# Patient Record
Sex: MALE | Race: WHITE | NOT HISPANIC OR LATINO | Employment: FULL TIME | URBAN - METROPOLITAN AREA
[De-identification: names, ages, dates, MRNs, and addresses within clinical notes are randomized per-mention and may not be internally consistent; named-entity substitution may affect disease eponyms.]

---

## 2024-02-07 ENCOUNTER — OCCUPATIONAL HEALTH (OUTPATIENT)
Dept: URGENT CARE | Facility: CLINIC | Age: 49
End: 2024-02-07

## 2024-02-07 VITALS — BODY MASS INDEX: 29.78 KG/M2 | HEIGHT: 70 IN | WEIGHT: 208 LBS

## 2024-02-07 DIAGNOSIS — Z02.1 PRE-EMPLOYMENT EXAMINATION: Primary | ICD-10-CM

## 2024-02-07 LAB
CTP QC/QA: YES
POC 10 PANEL DRUG SCREEN: NEGATIVE

## 2024-02-07 PROCEDURE — 99499 UNLISTED E&M SERVICE: CPT | Mod: S$GLB,,, | Performed by: NURSE PRACTITIONER

## 2024-02-07 PROCEDURE — 80305 DRUG TEST PRSMV DIR OPT OBS: CPT | Mod: S$GLB,,, | Performed by: NURSE PRACTITIONER

## 2024-02-07 PROCEDURE — 94010 BREATHING CAPACITY TEST: CPT | Mod: S$GLB,,, | Performed by: NURSE PRACTITIONER

## 2024-02-07 PROCEDURE — 99080 SPECIAL REPORTS OR FORMS: CPT | Mod: S$GLB,,, | Performed by: NURSE PRACTITIONER

## 2024-02-29 ENCOUNTER — OCCUPATIONAL HEALTH (OUTPATIENT)
Dept: URGENT CARE | Facility: CLINIC | Age: 49
End: 2024-02-29

## 2024-02-29 DIAGNOSIS — Z13.9 ENCOUNTER FOR SCREENING: Primary | ICD-10-CM

## 2024-02-29 PROCEDURE — 97750 PHYSICAL PERFORMANCE TEST: CPT | Mod: S$GLB,,, | Performed by: NURSE PRACTITIONER

## 2024-04-13 ENCOUNTER — CLINICAL SUPPORT (OUTPATIENT)
Dept: OTHER | Facility: CLINIC | Age: 49
End: 2024-04-13

## 2024-04-13 DIAGNOSIS — Z00.8 ENCOUNTER FOR OTHER GENERAL EXAMINATION: ICD-10-CM

## 2024-04-16 VITALS
SYSTOLIC BLOOD PRESSURE: 142 MMHG | DIASTOLIC BLOOD PRESSURE: 88 MMHG | HEIGHT: 71 IN | WEIGHT: 209 LBS | BODY MASS INDEX: 29.26 KG/M2

## 2024-04-16 LAB
HDLC SERPL-MCNC: 47 MG/DL
POC CHOLESTEROL, TOTAL: 120 MG/DL
POC GLUCOSE, FASTING: 101 MG/DL (ref 60–110)
TRIGL SERPL-MCNC: 44 MG/DL

## 2024-05-20 ENCOUNTER — OFFICE VISIT (OUTPATIENT)
Dept: URGENT CARE | Facility: CLINIC | Age: 49
End: 2024-05-20
Payer: COMMERCIAL

## 2024-05-20 VITALS
OXYGEN SATURATION: 98 % | HEART RATE: 70 BPM | SYSTOLIC BLOOD PRESSURE: 159 MMHG | BODY MASS INDEX: 30.66 KG/M2 | WEIGHT: 219 LBS | RESPIRATION RATE: 18 BRPM | HEIGHT: 71 IN | DIASTOLIC BLOOD PRESSURE: 96 MMHG

## 2024-05-20 DIAGNOSIS — Z02.6 ENCOUNTER RELATED TO WORKER'S COMPENSATION CLAIM: ICD-10-CM

## 2024-05-20 DIAGNOSIS — M25.562 PAIN AND SWELLING OF KNEE, LEFT: ICD-10-CM

## 2024-05-20 DIAGNOSIS — M23.92 INTERNAL DERANGEMENT OF LEFT KNEE: ICD-10-CM

## 2024-05-20 DIAGNOSIS — M25.462 PAIN AND SWELLING OF KNEE, LEFT: ICD-10-CM

## 2024-05-20 DIAGNOSIS — S83.412A SPRAIN OF MEDIAL COLLATERAL LIGAMENT OF LEFT KNEE, INITIAL ENCOUNTER: Primary | ICD-10-CM

## 2024-05-20 LAB
BREATH ALCOHOL: 0
CTP QC/QA: YES
POC 10 PANEL DRUG SCREEN: NEGATIVE

## 2024-05-20 PROCEDURE — 82075 ASSAY OF BREATH ETHANOL: CPT | Mod: S$GLB,,, | Performed by: STUDENT IN AN ORGANIZED HEALTH CARE EDUCATION/TRAINING PROGRAM

## 2024-05-20 PROCEDURE — 99215 OFFICE O/P EST HI 40 MIN: CPT | Mod: S$GLB,,, | Performed by: STUDENT IN AN ORGANIZED HEALTH CARE EDUCATION/TRAINING PROGRAM

## 2024-05-20 PROCEDURE — 73562 X-RAY EXAM OF KNEE 3: CPT | Mod: LT,S$GLB,, | Performed by: RADIOLOGY

## 2024-05-20 PROCEDURE — 80305 DRUG TEST PRSMV DIR OPT OBS: CPT | Mod: S$GLB,,, | Performed by: STUDENT IN AN ORGANIZED HEALTH CARE EDUCATION/TRAINING PROGRAM

## 2024-05-20 RX ORDER — ATORVASTATIN CALCIUM 20 MG/1
1 TABLET, FILM COATED ORAL DAILY
COMMUNITY
Start: 2024-05-17

## 2024-05-20 RX ORDER — AMLODIPINE BESYLATE 2.5 MG/1
1 TABLET ORAL DAILY
COMMUNITY
Start: 2024-05-17

## 2024-05-20 NOTE — LETTER
[facility]  [address] [address]  Phone: [phone]  Fax: [phone]  [facility]: [id number]    Pt Name: Bernabe Kemp  Injury Date: 05/16/2024   Employee ID:  Date of First Treatment: 05/20/2024   Company: [employer]      Appointment Time: 09:45 AM Arrived: 9:31 AM   Provider: Tracy Rogel MD Time Out:12:10 PM     Office Treatment:   1. Sprain of medial collateral ligament of left knee, initial encounter    2. Encounter related to worker's compensation claim    3. Internal derangement of left knee    4. Pain and swelling of knee, left          Patient Instructions: MRI to be scheduled once authorized, Attention not to aggravate affected area        Restrictions: Sit down work only (Patient should be allowed to wear knee brace and use crutches at work. No climbing, kneeling, squatting, or bending.)     Return Appointment: 6/10/2024 at 2:00 PM    KW
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
## 2024-06-10 ENCOUNTER — OFFICE VISIT (OUTPATIENT)
Dept: URGENT CARE | Facility: CLINIC | Age: 49
End: 2024-06-10
Payer: COMMERCIAL

## 2024-06-10 VITALS
BODY MASS INDEX: 30.66 KG/M2 | SYSTOLIC BLOOD PRESSURE: 163 MMHG | HEIGHT: 71 IN | HEART RATE: 76 BPM | RESPIRATION RATE: 12 BRPM | OXYGEN SATURATION: 99 % | DIASTOLIC BLOOD PRESSURE: 85 MMHG | WEIGHT: 219 LBS

## 2024-06-10 DIAGNOSIS — S82.142D CLOSED FRACTURE OF LEFT TIBIAL PLATEAU WITH ROUTINE HEALING, SUBSEQUENT ENCOUNTER: ICD-10-CM

## 2024-06-10 DIAGNOSIS — S83.412D TEAR OF MCL (MEDIAL COLLATERAL LIGAMENT) OF KNEE, LEFT, SUBSEQUENT ENCOUNTER: Primary | ICD-10-CM

## 2024-06-10 DIAGNOSIS — Z02.6 ENCOUNTER RELATED TO WORKER'S COMPENSATION CLAIM: ICD-10-CM

## 2024-06-10 PROCEDURE — 99214 OFFICE O/P EST MOD 30 MIN: CPT | Mod: S$GLB,,, | Performed by: STUDENT IN AN ORGANIZED HEALTH CARE EDUCATION/TRAINING PROGRAM

## 2024-06-10 NOTE — PROGRESS NOTES
Subjective:    Patient's place of employment - [employer]  Patient's job title -   Date of Injury - 5.16.2024  Body part injured - Left Knee  Current work status per last visit - Light Duty - working well  Improved, same, or worse - better  Pain Scale right now (1-10) 3/10    Patient ID: Bernabe Kemp is a 49 y.o. male.    Chief Complaint: Knee Injury    Knee Injury  The current episode started more than 1 month ago. The problem has been gradually improving. Associated symptoms include myalgias. Pertinent negatives include no abdominal pain, anorexia, arthralgias, change in bowel habit, chest pain, chills, congestion, coughing, diaphoresis, fatigue, fever, headaches, joint swelling, nausea, neck pain, numbness, rash, sore throat, swollen glands, urinary symptoms, vertigo, visual change, vomiting or weakness.       Constitution: Negative. Negative for chills, sweating, fatigue and fever.   HENT:  Negative for congestion and sore throat.    Neck: neck negative. Negative for neck pain.   Cardiovascular:  Negative for chest pain.   Eyes: Negative.    Respiratory:  Negative for cough.    Gastrointestinal: Negative.  Negative for abdominal pain, nausea and vomiting.   Genitourinary: Negative.    Musculoskeletal:  Positive for pain, abnormal ROM of joint, pain with walking and muscle ache. Negative for joint pain and joint swelling.   Skin: Negative.  Negative for rash and erythema.   Allergic/Immunologic: Negative.    Neurological: Negative.  Negative for history of vertigo, headaches and numbness.   Hematologic/Lymphatic: Negative.    Psychiatric/Behavioral: Negative.         See MA note above. Begin MD note:  Patient here to review MRI results.     Objective:     Physical Exam  Vitals and nursing note reviewed.   Constitutional:       General: He is not in acute distress.     Appearance: Normal appearance. He is not ill-appearing.   HENT:      Head: Normocephalic.   Eyes:      Conjunctiva/sclera:  Conjunctivae normal.   Pulmonary:      Effort: No respiratory distress.   Musculoskeletal:      Comments: Left knee: sits with leg slightly extended, unable to flex beyond 100 degrees, able to extend fully. Provocative maneuvers deferred. TTP medial and anterior knee. Mild swelling, no erythema or warmth.    Skin:     Findings: No erythema.   Neurological:      Mental Status: He is alert and oriented to person, place, and time.      GCS: GCS eye subscore is 4. GCS verbal subscore is 5. GCS motor subscore is 6.   Psychiatric:         Attention and Perception: Attention normal.         Mood and Affect: Mood normal.         Behavior: Behavior normal.        Assessment:      1. Tear of MCL (medial collateral ligament) of knee, left, subsequent encounter    2. Encounter related to worker's compensation claim    3. Closed fracture of left tibial plateau with routine healing, subsequent encounter      MRI Knee Without Contrast Left  Order: [id number]  Impression    1.   High-grade 2 versus full-thickness superficial MCL tear at the femoral attachment. There is also a least a grade 2 sprain of the deep MCL/meniscofemoral ligament as well as the medial patellar retinaculum.  2.   Slight thickening of the ACL and PCL probably reflects mucoid degeneration. Low-grade sprains are considered less likely.  3.   Lateral collateral ligament complex intact.  4.   Nondisplaced, nondepressed lateral tibial plateau fracture without definite involvement of the cartilage. Bone contusing along the lateral femoral condyle.  5.   Prominent subcutaneous edema about the knee. Tiny joint effusion. Localized prepatellar bursal fluid may be correlated for bursitis versus small posttraumatic hematoma/fluid collection.            Electronically Signed By: Yosef Zuniga MD 5/31/2024 12:24 CDT    Plan:     See chart for full MRI report. We reviewed the results and he's been referred to ortho. Patient has severely limited mobility in the left knee  with concern for full thickness tear as well as a tibial plateau fracture. F/u in approx 2 weeks on the Gay, ok to reschedule visit with me if ortho appointment has been approved for shortly afterward. Work restrictions to remain unchanged for now.          Patient Instructions: Referral to specialist to be scheduled, once authorized   Restrictions: Sit down work only (Patient should be allowed to wear knee brace and use crutches at work.)  Follow up in about 11 days (around 6/21/2024).    I spent a total of 30 minutes on the day of the visit. This includes face to face time and non-face to face time preparing to see the patient (eg, review of tests, prior records/notes), obtaining and/or reviewing separately obtained history, documenting clinical information in the electronic or other health record, independently interpreting results and communicating results to the patient.

## 2024-06-10 NOTE — LETTER
[facility]  [address] [address]  Phone: [phone]  Fax: [phone]  [facility]: [id number]    Pt Name: Bernabe Kemp  Injury Date: 05/16/2024   Employee ID: [id number] Date of Treatment: 06/10/2024   Company: [employer]      Appointment Time: 01:45 PM Arrived: 2:00 PM    Provider: Tracy Rogel MD Time Out:3:25 PM      Office Treatment:   1. Tear of MCL (medial collateral ligament) of knee, left, subsequent encounter    2. Encounter related to worker's compensation claim    3. Closed fracture of left tibial plateau with routine healing, subsequent encounter          Patient Instructions: Referral to specialist to be scheduled, once authorized      Restrictions: Sit down work only (Patient should be allowed to wear knee brace and use crutches at work.)     Return Appointment: 6/21/24  at 3:30 [facility]

## 2024-06-11 ENCOUNTER — TELEPHONE (OUTPATIENT)
Dept: SPORTS MEDICINE | Facility: CLINIC | Age: 49
End: 2024-06-11
Payer: COMMERCIAL

## 2024-06-13 ENCOUNTER — OFFICE VISIT (OUTPATIENT)
Dept: SPORTS MEDICINE | Facility: CLINIC | Age: 49
End: 2024-06-13
Payer: COMMERCIAL

## 2024-06-13 VITALS
WEIGHT: 215 LBS | BODY MASS INDEX: 30.1 KG/M2 | SYSTOLIC BLOOD PRESSURE: 153 MMHG | HEIGHT: 71 IN | HEART RATE: 96 BPM | DIASTOLIC BLOOD PRESSURE: 83 MMHG

## 2024-06-13 DIAGNOSIS — M25.562 ACUTE PAIN OF LEFT KNEE: ICD-10-CM

## 2024-06-13 DIAGNOSIS — S83.412A TEAR OF MCL (MEDIAL COLLATERAL LIGAMENT) OF KNEE, LEFT, INITIAL ENCOUNTER: Primary | ICD-10-CM

## 2024-06-13 PROCEDURE — 99203 OFFICE O/P NEW LOW 30 MIN: CPT | Mod: S$GLB,,, | Performed by: ORTHOPAEDIC SURGERY

## 2024-06-13 PROCEDURE — 99999 PR PBB SHADOW E&M-EST. PATIENT-LVL III: CPT | Mod: PBBFAC,,, | Performed by: ORTHOPAEDIC SURGERY

## 2024-06-13 NOTE — PROGRESS NOTES
CC: Left knee pain    49 y.o. Male Presents for evaluation of left knee pain.  This is a worker's compensation case.  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  On May 16, 2024 he was unloading a truck when his left leg got stuck between a palate in his truck causing a twisting type mechanism to the left knee and acute onset of pain.  He endorses swelling that night.  Most of his pain is medially based in the knee.  Prior to this incident his knee was not causing him any issues.  Since that time he has been using a brace that he bought as well as crutches.  He has been placed on light duty at work.  He takes occasional over-the-counter anti-inflammatories for pain control.    REVIEW OF SYSTEMS:  Constitution: Negative. Negative for chills, fever and night sweats.   HENT: Negative for congestion and headaches.    Eyes: Negative for blurred vision, left vision loss and right vision loss.   Cardiovascular: Negative for chest pain and syncope.   Respiratory: Negative for cough and shortness of breath.    Endocrine: Negative for polydipsia, polyphagia and polyuria.   Hematologic/Lymphatic: Negative for bleeding problem. Does not bruise/bleed easily.   Skin: Negative for dry skin, itching and rash.   Musculoskeletal: Negative for falls. Positive for left knee pain and  muscle weakness.   Gastrointestinal: Negative for abdominal pain and bowel incontinence.   Genitourinary: Negative for bladder incontinence and nocturia.   Neurological: Negative for disturbances in coordination, loss of balance and seizures.   Psychiatric/Behavioral: Negative for depression. The patient does not have insomnia.    Allergic/Immunologic: Negative for hives and persistent infections.     PAST MEDICAL HISTORY:    History reviewed. No pertinent past medical history.    PAST SURGICAL HISTORY:   History reviewed. No pertinent surgical history.    FAMILY HISTORY:   No family history on file.    SOCIAL HISTORY:   Social History  "    Socioeconomic History    Marital status:    Tobacco Use    Smoking status: Never     Passive exposure: Never    Smokeless tobacco: Never   Social History Narrative    ** Merged History Encounter **            MEDICATIONS:     Current Outpatient Medications:     amLODIPine (NORVASC) 2.5 MG tablet, Take 1 tablet by mouth once daily., Disp: , Rfl:     atorvastatin (LIPITOR) 20 MG tablet, Take 1 tablet by mouth once daily., Disp: , Rfl:     ALLERGIES:   Review of patient's allergies indicates:  No Known Allergies    VITAL SIGNS:   BP (!) 153/83   Pulse 96   Ht 5' 11" (1.803 m)   Wt 97.5 kg (215 lb)   BMI 29.99 kg/m²      PHYSICAL EXAMINATION  General:  The patient is alert and oriented x 3.  Mood is pleasant.  Observation of ears, eyes and nose reveal no gross abnormalities.  No labored breathing observed.    LEFT KNEE EXAMINATION     OBSERVATION / INSPECTION   Gait:   Antalgic left  Alignment:  Neutral   Scars:   None   Muscle atrophy: Mild  Effusion:  None   Warmth:  None   Discoloration:   none     TENDERNESS / CREPITUS (T / C):          T / C      T / C   Patella   - / -   Lateral joint line   - / -   Peripatellar medial  -  Medial joint line    - / -   Peripatellar lateral -  Medial plica   - / -   Patellar tendon -   Popliteal fossa   - / -   Quad tendon   -   Gastrocnemius   -   Prepatellar Bursa - / -   Quadricep   -   Tibial tubercle  -  Thigh/hamstring  -   Pes anserine/HS -  Fibula    -   ITB   - / -  Tibia     -   Tib/fib joint  - / -  LCL    -     MFC   + / -   MCL: Proximal  +    LFC   - / -    Distal   -          ROM: (* = pain)  PASSIVE   ACTIVE    Left :   0 / 0 / 100 *   0 / 0 / 90 *   Right :    5 / 0 / 145   5 / 0 / 145    Patellofemoral examination:  See above noted areas of tenderness.   Patella position    Subluxation / dislocation: Centered           Sup. / Inf;   Normal   Crepitus (PF):    Absent   Patellar Mobility:       Medial-lateral:   Normal    Superior-inferior:  Normal "    Inferior tilt   Normal    Patellar tendon:  Normal   Lateral tilt:    Normal   J-sign:     None   Patellofemoral grind:   No pain       MENISCAL SIGNS:     Pain on terminal extension:  -  Pain on terminal flexion:  +  Joes maneuver:  Unable to perform  Squat     Unable to do     LIGAMENT EXAMINATION:  ACL / Lachman:  normal (-1 to 2mm)    PCL-Post.  drawer: normal 0 to 2mm  MCL- Valgus:  Abormal, grade 2   LCL- Varus:  normal 0 to 2mm  Pivot shift: normal (Equal)   Dial Test: difference c/w other side   At 30° flexion: normal (< 5°)    At 90° flexion: normal (< 5°)   Reverse Pivot Shift:   normal (Equal)     STRENGTH: (* = with pain) PAINFUL SIDE   Quadricep   5/5   Hamstrin/5    EXTREMITY NEURO-VASCULAR EXAMINATION:   Sensation:  Grossly intact to light touch all dermatomal regions.   Motor Function:  Fully intact motor function at hip, knee, foot and ankle    DTRs;  quadriceps and  achilles 2+.  No clonus and downgoing Babinski.    Vascular status:  DP and PT pulses 2+, brisk capillary refill, symmetric.     IMAGING:     X-rays including standing, weight bearing AP and flexion bilateral knees, lateral and merchant views ordered and images reviewed by me show:    No fracture, dislocation or other pathology   Medial compartment: no degenerative changes   Lateral compartment: no degenerative changes   Patellofemoral compartment: no degenerative changes    External MRI L knee reviewed by me: grade 2 MCL tear at the femoral insertion, joint effusion        ASSESSMENT:    Left Knee  Pain, possible   1. Tear of MCL (medial collateral ligament) of knee, left, initial encounter    2. Acute pain of left knee         PLAN:   1. Short runner brace L knee  2. Wean off crutches as tolerated, WBAT   3. PT L knee  4. Follow up in 4 weeks for repeat evaluation   5. Light duty work status for next 4 weeks.     All questions were answered, pt will contact us for questions or concerns in the interim.

## 2024-06-13 NOTE — LETTER
Patient: Bernabe Kemp   YOB: 1975   Clinic Number: [id number]   Today's Date: June 13, 2024          Work Status Summary     Patient was seen by Dr Dandre Brown on 6/13/2024.    Mr Kemp is to be on Light duty until next follow up visit. At that time we will re-evaluate and make updates.        Next Appointment: 8/1/2024           Dandre Brown MD

## 2024-06-19 ENCOUNTER — TELEPHONE (OUTPATIENT)
Dept: SPORTS MEDICINE | Facility: CLINIC | Age: 49
End: 2024-06-19
Payer: COMMERCIAL

## 2024-08-01 ENCOUNTER — OFFICE VISIT (OUTPATIENT)
Dept: SPORTS MEDICINE | Facility: CLINIC | Age: 49
End: 2024-08-01
Payer: COMMERCIAL

## 2024-08-01 VITALS
WEIGHT: 227.5 LBS | SYSTOLIC BLOOD PRESSURE: 165 MMHG | BODY MASS INDEX: 31.85 KG/M2 | DIASTOLIC BLOOD PRESSURE: 77 MMHG | HEIGHT: 71 IN | HEART RATE: 82 BPM

## 2024-08-01 DIAGNOSIS — S83.412A TEAR OF MCL (MEDIAL COLLATERAL LIGAMENT) OF KNEE, LEFT, INITIAL ENCOUNTER: Primary | ICD-10-CM

## 2024-08-01 PROCEDURE — 99999 PR PBB SHADOW E&M-EST. PATIENT-LVL III: CPT | Mod: PBBFAC,,, | Performed by: ORTHOPAEDIC SURGERY

## 2024-08-01 NOTE — PROGRESS NOTES
CC: Left knee pain    Patient returns to clinic for follow up of left knee. He reports moderate relief following PT. He notes minimal pain with crossing his knee.   Patient was discharged from PT and has transitioned to an HEP that he is compliant with.     Initial hx:   49 y.o. Male Presents for evaluation of left knee pain.  This is a worker's compensation case.  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  On May 16, 2024 he was unloading a truck when his left leg got stuck between a palate in his truck causing a twisting type mechanism to the left knee and acute onset of pain.  He endorses swelling that night.  Most of his pain is medially based in the knee.  Prior to this incident his knee was not causing him any issues.  Since that time he has been using a brace that he bought as well as crutches.  He has been placed on light duty at work.  He takes occasional over-the-counter anti-inflammatories for pain control.    REVIEW OF SYSTEMS:  Constitution: Negative. Negative for chills, fever and night sweats.   HENT: Negative for congestion and headaches.    Eyes: Negative for blurred vision, left vision loss and right vision loss.   Cardiovascular: Negative for chest pain and syncope.   Respiratory: Negative for cough and shortness of breath.    Endocrine: Negative for polydipsia, polyphagia and polyuria.   Hematologic/Lymphatic: Negative for bleeding problem. Does not bruise/bleed easily.   Skin: Negative for dry skin, itching and rash.   Musculoskeletal: Negative for falls. Positive for left knee pain and  muscle weakness.   Gastrointestinal: Negative for abdominal pain and bowel incontinence.   Genitourinary: Negative for bladder incontinence and nocturia.   Neurological: Negative for disturbances in coordination, loss of balance and seizures.   Psychiatric/Behavioral: Negative for depression. The patient does not have insomnia.    Allergic/Immunologic: Negative for hives and persistent infections.  "    PAST MEDICAL HISTORY:    History reviewed. No pertinent past medical history.    PAST SURGICAL HISTORY:   History reviewed. No pertinent surgical history.    FAMILY HISTORY:   No family history on file.    SOCIAL HISTORY:   Social History     Socioeconomic History    Marital status:    Tobacco Use    Smoking status: Never     Passive exposure: Never    Smokeless tobacco: Never   Social History Narrative    ** Merged History Encounter **            MEDICATIONS:     Current Outpatient Medications:     amLODIPine (NORVASC) 2.5 MG tablet, Take 1 tablet by mouth once daily., Disp: , Rfl:     atorvastatin (LIPITOR) 20 MG tablet, Take 1 tablet by mouth once daily., Disp: , Rfl:     ALLERGIES:   Review of patient's allergies indicates:  No Known Allergies    VITAL SIGNS:   BP (!) 165/77   Pulse 82   Ht 5' 11" (1.803 m)   Wt 103.2 kg (227 lb 8.2 oz)   BMI 31.73 kg/m²      PHYSICAL EXAMINATION  General:  The patient is alert and oriented x 3.  Mood is pleasant.  Observation of ears, eyes and nose reveal no gross abnormalities.  No labored breathing observed.    LEFT KNEE EXAMINATION     OBSERVATION / INSPECTION   Gait:   Antalgic left  Alignment:  Neutral   Scars:   None   Muscle atrophy: Mild  Effusion:  None   Warmth:  None   Discoloration:   none     TENDERNESS / CREPITUS (T / C):          T / C      T / C   Patella   - / -   Lateral joint line   - / -   Peripatellar medial  -  Medial joint line    - / -   Peripatellar lateral -  Medial plica   - / -   Patellar tendon -   Popliteal fossa   - / -   Quad tendon   -   Gastrocnemius   -   Prepatellar Bursa - / -   Quadricep   -   Tibial tubercle  -  Thigh/hamstring  -   Pes anserine/HS -  Fibula    -   ITB   - / -  Tibia     -   Tib/fib joint  - / -  LCL    -     MFC   + / -   MCL: Proximal  +    LFC   - / -    Distal   -          ROM: (* = pain)  PASSIVE   ACTIVE    Left :   0 / 0 / 100 *   0 / 0 / 90 *   Right :    5 / 0 / 145   5 / 0 / 145    Patellofemoral " examination:  See above noted areas of tenderness.   Patella position    Subluxation / dislocation: Centered           Sup. / Inf;   Normal   Crepitus (PF):    Absent   Patellar Mobility:       Medial-lateral:   Normal    Superior-inferior:  Normal    Inferior tilt   Normal    Patellar tendon:  Normal   Lateral tilt:    Normal   J-sign:     None   Patellofemoral grind:   No pain       MENISCAL SIGNS:     Pain on terminal extension:  -  Pain on terminal flexion:  +  Joes maneuver:  Unable to perform  Squat     Unable to do     LIGAMENT EXAMINATION:  ACL / Lachman:  normal (-1 to 2mm)    PCL-Post.  drawer: normal 0 to 2mm  MCL- Valgus:  Abormal, grade 2   LCL- Varus:  normal 0 to 2mm  Pivot shift: normal (Equal)   Dial Test: difference c/w other side   At 30° flexion: normal (< 5°)    At 90° flexion: normal (< 5°)   Reverse Pivot Shift:   normal (Equal)     STRENGTH: (* = with pain) PAINFUL SIDE   Quadricep   5/5   Hamstrin/5    EXTREMITY NEURO-VASCULAR EXAMINATION:   Sensation:  Grossly intact to light touch all dermatomal regions.   Motor Function:  Fully intact motor function at hip, knee, foot and ankle    DTRs;  quadriceps and  achilles 2+.  No clonus and downgoing Babinski.    Vascular status:  DP and PT pulses 2+, brisk capillary refill, symmetric.     IMAGING:     X-rays including standing, weight bearing AP and flexion bilateral knees, lateral and merchant views ordered and images reviewed by me show:    No fracture, dislocation or other pathology   Medial compartment: no degenerative changes   Lateral compartment: no degenerative changes   Patellofemoral compartment: no degenerative changes    External MRI L knee reviewed by me: grade 2 MCL tear at the femoral insertion, joint effusion        ASSESSMENT:    Left Knee  Pain, possible   1. Tear of MCL (medial collateral ligament) of knee, left, initial encounter         PLAN:   1. Continue HEP    2. F/u PRN     3. RTW 24 with no  restrictions.     All questions were answered, pt will contact us for questions or concerns in the interim.

## 2025-04-05 ENCOUNTER — CLINICAL SUPPORT (OUTPATIENT)
Dept: OTHER | Facility: CLINIC | Age: 50
End: 2025-04-05

## 2025-04-05 ENCOUNTER — LAB REQUISITION (OUTPATIENT)
Dept: LAB | Facility: HOSPITAL | Age: 50
End: 2025-04-05

## 2025-04-05 DIAGNOSIS — Z00.8 ENCOUNTER FOR OTHER GENERAL EXAMINATION: ICD-10-CM

## 2025-04-05 LAB — PSA SERPL-MCNC: 1.1 NG/ML

## 2025-04-05 PROCEDURE — 84153 ASSAY OF PSA TOTAL: CPT | Performed by: INTERNAL MEDICINE

## 2025-04-08 VITALS
WEIGHT: 225 LBS | HEIGHT: 71 IN | DIASTOLIC BLOOD PRESSURE: 78 MMHG | BODY MASS INDEX: 31.5 KG/M2 | SYSTOLIC BLOOD PRESSURE: 146 MMHG

## 2025-04-08 LAB
HDLC SERPL-MCNC: 35 MG/DL
POC CHOLESTEROL, TOTAL: 115 MG/DL
POC GLUCOSE, FASTING: 101 MG/DL (ref 60–110)
TRIGL SERPL-MCNC: 44 MG/DL

## 2025-04-10 ENCOUNTER — RESULTS FOLLOW-UP (OUTPATIENT)
Dept: OTHER | Facility: CLINIC | Age: 50
End: 2025-04-10